# Patient Record
Sex: MALE | Race: WHITE
[De-identification: names, ages, dates, MRNs, and addresses within clinical notes are randomized per-mention and may not be internally consistent; named-entity substitution may affect disease eponyms.]

---

## 2020-07-01 ENCOUNTER — HOSPITAL ENCOUNTER (EMERGENCY)
Dept: HOSPITAL 53 - M ED | Age: 60
Discharge: HOME | End: 2020-07-01
Payer: COMMERCIAL

## 2020-07-01 VITALS — WEIGHT: 246.92 LBS | HEIGHT: 72 IN | BODY MASS INDEX: 33.44 KG/M2

## 2020-07-01 VITALS — DIASTOLIC BLOOD PRESSURE: 69 MMHG | SYSTOLIC BLOOD PRESSURE: 142 MMHG

## 2020-07-01 DIAGNOSIS — Z79.899: ICD-10-CM

## 2020-07-01 DIAGNOSIS — R55: Primary | ICD-10-CM

## 2020-07-01 DIAGNOSIS — I10: ICD-10-CM

## 2020-07-01 LAB
ALBUMIN SERPL BCG-MCNC: 3.7 GM/DL (ref 3.2–5.2)
ALT SERPL W P-5'-P-CCNC: 38 U/L (ref 12–78)
BASOPHILS # BLD AUTO: 0.1 10^3/UL (ref 0–0.2)
BASOPHILS NFR BLD AUTO: 0.8 % (ref 0–1)
BILIRUB CONJ SERPL-MCNC: 0.1 MG/DL (ref 0–0.2)
BILIRUB SERPL-MCNC: 0.6 MG/DL (ref 0.2–1)
BUN SERPL-MCNC: 11 MG/DL (ref 7–18)
CALCIUM SERPL-MCNC: 9.3 MG/DL (ref 8.5–10.1)
CHLORIDE SERPL-SCNC: 106 MEQ/L (ref 98–107)
CK MB CFR.DF SERPL CALC: 1.15
CK MB SERPL-MCNC: < 1 NG/ML (ref ?–3.6)
CK SERPL-CCNC: 87 U/L (ref 39–308)
CO2 SERPL-SCNC: 26 MEQ/L (ref 21–32)
CREAT SERPL-MCNC: 1.02 MG/DL (ref 0.7–1.3)
EOSINOPHIL # BLD AUTO: 0.1 10^3/UL (ref 0–0.5)
EOSINOPHIL NFR BLD AUTO: 1 % (ref 0–3)
GFR SERPL CREATININE-BSD FRML MDRD: > 60 ML/MIN/{1.73_M2} (ref 56–?)
GLUCOSE SERPL-MCNC: 141 MG/DL (ref 70–100)
HCT VFR BLD AUTO: 45.4 % (ref 42–52)
HGB BLD-MCNC: 15 G/DL (ref 13.5–17.5)
LYMPHOCYTES # BLD AUTO: 1.8 10^3/UL (ref 1.5–5)
LYMPHOCYTES NFR BLD AUTO: 28.9 % (ref 24–44)
MCH RBC QN AUTO: 31.4 PG (ref 27–33)
MCHC RBC AUTO-ENTMCNC: 33 G/DL (ref 32–36.5)
MCV RBC AUTO: 95 FL (ref 80–96)
MONOCYTES # BLD AUTO: 0.5 10^3/UL (ref 0–0.8)
MONOCYTES NFR BLD AUTO: 7.2 % (ref 0–5)
NEUTROPHILS # BLD AUTO: 3.9 10^3/UL (ref 1.5–8.5)
NEUTROPHILS NFR BLD AUTO: 61.6 % (ref 36–66)
PLATELET # BLD AUTO: 221 10^3/UL (ref 150–450)
POTASSIUM SERPL-SCNC: 4.1 MEQ/L (ref 3.5–5.1)
PROT SERPL-MCNC: 7.4 GM/DL (ref 6.4–8.2)
RBC # BLD AUTO: 4.78 10^6/UL (ref 4.3–6.1)
SODIUM SERPL-SCNC: 138 MEQ/L (ref 136–145)
T4 FREE SERPL-MCNC: 1.14 NG/DL (ref 0.76–1.46)
TROPONIN I SERPL-MCNC: < 0.02 NG/ML (ref ?–0.1)
TSH SERPL DL<=0.005 MIU/L-ACNC: 1.87 UIU/ML (ref 0.36–3.74)
WBC # BLD AUTO: 6.3 10^3/UL (ref 4–10)

## 2020-07-01 NOTE — REP
Head CT without contrast:

 

History:  Dizziness.

 

Comparison study: No comparison study.

 

CT findings:  Bone window settings demonstrate an intact bony calvarium.  There

is no evidence of skull fracture or incidental bony calvarial lesion.  The

visualized paranasal sinuses appear clear.  No intraorbital abnormality is seen.

On soft tissue window setting images; the lateral, third, and fourth ventricles

are normal in size and position.  Gray-white differentiation pattern is normal

above and below the tentorium.  There are is no evidence of intracranial

hemorrhage.  No mass, edema, infarction, or midline shift is seen.  No

extra-axial fluid collection is appreciated.

 

Impression:

 

Negative noncontrast head CT.

 

 

Electronically Signed by

Leonardo Torres MD 07/01/2020 09:47 A

## 2020-07-01 NOTE — REP
CHEST X-RAY:  Two views.

 

HISTORY:  Near syncope.

 

COMPARISON STUDY: No comparison.

 

FINDINGS:  The lungs are well inflated and free of infiltrate.  The pleural

angles are sharp.  The heart size is normal.  Pulmonary vasculature is not

increased.  No significant bony abnormality is seen. Monitoring electrodes are

visible.

 

IMPRESSION:

 

Negative chest x-ray.

 

 

Electronically Signed by

Leonardo Torres MD 07/01/2020 10:55 A

## 2020-07-02 NOTE — ECGEPIP
[facility] - ED

                                       

                                       Test Date:    2020

Pat Name:     SEBASTIAN KATHLEEN          Department:   

Patient ID:   [id number]                 Room:         -

Gender:       Male                     Technician:   [email or web address]

:          1960               Requested By: DILMA COLBERT

Order Number: [id number]     Reading MD:   Hao Kumar

                                 Measurements

Intervals                              Axis          

Rate:         72                       P:            18

MN:           146                      QRS:          4

QRSD:         88                       T:            18

QT:           403                                    

QTc:          443                                    

                           Interpretive Statements

SINUS RHYTHM

NSTTW ABNORMALITIES

NO PRIORS FOR COMPARISON

Electronically Signed on 2020 6:14:01 EDT by Hao Kumar